# Patient Record
Sex: FEMALE | Race: WHITE | Employment: UNEMPLOYED | URBAN - METROPOLITAN AREA
[De-identification: names, ages, dates, MRNs, and addresses within clinical notes are randomized per-mention and may not be internally consistent; named-entity substitution may affect disease eponyms.]

---

## 2021-01-22 ENCOUNTER — HOSPITAL ENCOUNTER (EMERGENCY)
Age: 61
Discharge: HOME | End: 2021-01-23
Attending: EMERGENCY MEDICINE
Payer: COMMERCIAL

## 2021-01-22 ENCOUNTER — APPOINTMENT (OUTPATIENT)
Dept: CT IMAGING | Age: 61
End: 2021-01-22
Attending: EMERGENCY MEDICINE
Payer: COMMERCIAL

## 2021-01-22 ENCOUNTER — APPOINTMENT (OUTPATIENT)
Dept: ULTRASOUND IMAGING | Age: 61
End: 2021-01-22
Attending: EMERGENCY MEDICINE
Payer: COMMERCIAL

## 2021-01-22 DIAGNOSIS — R10.84 ABDOMINAL PAIN, GENERALIZED: Primary | ICD-10-CM

## 2021-01-22 DIAGNOSIS — D17.71 ANGIOMYOLIPOMA OF KIDNEY: ICD-10-CM

## 2021-01-22 DIAGNOSIS — K76.89 HEPATIC CYST: ICD-10-CM

## 2021-01-22 LAB
ALBUMIN SERPL-MCNC: 4 G/DL (ref 3.5–5)
ALBUMIN/GLOB SERPL: 1.1 {RATIO} (ref 1.1–2.2)
ALP SERPL-CCNC: 63 U/L (ref 45–117)
ALT SERPL-CCNC: 21 U/L (ref 12–78)
ANION GAP SERPL CALC-SCNC: 11 MMOL/L (ref 5–15)
APPEARANCE UR: CLEAR
AST SERPL-CCNC: 19 U/L (ref 15–37)
BACTERIA URNS QL MICRO: NEGATIVE /HPF
BASOPHILS # BLD: 0 K/UL (ref 0–0.1)
BASOPHILS NFR BLD: 0 % (ref 0–1)
BILIRUB SERPL-MCNC: 0.5 MG/DL (ref 0.2–1)
BILIRUB UR QL: NEGATIVE
BUN SERPL-MCNC: 18 MG/DL (ref 6–20)
BUN/CREAT SERPL: 21 (ref 12–20)
CALCIUM SERPL-MCNC: 9.2 MG/DL (ref 8.5–10.1)
CHLORIDE SERPL-SCNC: 100 MMOL/L (ref 97–108)
CO2 SERPL-SCNC: 29 MMOL/L (ref 21–32)
COLOR UR: ABNORMAL
COMMENT, HOLDF: NORMAL
CREAT SERPL-MCNC: 0.87 MG/DL (ref 0.55–1.02)
DIFFERENTIAL METHOD BLD: NORMAL
EOSINOPHIL # BLD: 0.2 K/UL (ref 0–0.4)
EOSINOPHIL NFR BLD: 3 % (ref 0–7)
EPITH CASTS URNS QL MICRO: ABNORMAL /LPF
ERYTHROCYTE [DISTWIDTH] IN BLOOD BY AUTOMATED COUNT: 12.6 % (ref 11.5–14.5)
GLOBULIN SER CALC-MCNC: 3.8 G/DL (ref 2–4)
GLUCOSE SERPL-MCNC: 98 MG/DL (ref 65–100)
GLUCOSE UR STRIP.AUTO-MCNC: NEGATIVE MG/DL
HCT VFR BLD AUTO: 41.2 % (ref 35–47)
HGB BLD-MCNC: 13.7 G/DL (ref 11.5–16)
HGB UR QL STRIP: ABNORMAL
IMM GRANULOCYTES # BLD AUTO: 0 K/UL (ref 0–0.04)
IMM GRANULOCYTES NFR BLD AUTO: 0 % (ref 0–0.5)
KETONES UR QL STRIP.AUTO: NEGATIVE MG/DL
LEUKOCYTE ESTERASE UR QL STRIP.AUTO: ABNORMAL
LIPASE SERPL-CCNC: 277 U/L (ref 73–393)
LYMPHOCYTES # BLD: 1.6 K/UL (ref 0.8–3.5)
LYMPHOCYTES NFR BLD: 24 % (ref 12–49)
MCH RBC QN AUTO: 28.2 PG (ref 26–34)
MCHC RBC AUTO-ENTMCNC: 33.3 G/DL (ref 30–36.5)
MCV RBC AUTO: 84.8 FL (ref 80–99)
MONOCYTES # BLD: 0.6 K/UL (ref 0–1)
MONOCYTES NFR BLD: 9 % (ref 5–13)
NEUTS SEG # BLD: 4.4 K/UL (ref 1.8–8)
NEUTS SEG NFR BLD: 64 % (ref 32–75)
NITRITE UR QL STRIP.AUTO: NEGATIVE
NRBC # BLD: 0 K/UL (ref 0–0.01)
NRBC BLD-RTO: 0 PER 100 WBC
PH UR STRIP: 6 [PH] (ref 5–8)
PLATELET # BLD AUTO: 283 K/UL (ref 150–400)
PMV BLD AUTO: 10.5 FL (ref 8.9–12.9)
POTASSIUM SERPL-SCNC: 3.3 MMOL/L (ref 3.5–5.1)
PROT SERPL-MCNC: 7.8 G/DL (ref 6.4–8.2)
PROT UR STRIP-MCNC: NEGATIVE MG/DL
RBC # BLD AUTO: 4.86 M/UL (ref 3.8–5.2)
RBC #/AREA URNS HPF: ABNORMAL /HPF (ref 0–5)
SAMPLES BEING HELD,HOLD: NORMAL
SODIUM SERPL-SCNC: 140 MMOL/L (ref 136–145)
SP GR UR REFRACTOMETRY: 1.01 (ref 1–1.03)
UR CULT HOLD, URHOLD: NORMAL
UROBILINOGEN UR QL STRIP.AUTO: 0.2 EU/DL (ref 0.2–1)
WBC # BLD AUTO: 6.9 K/UL (ref 3.6–11)
WBC URNS QL MICRO: ABNORMAL /HPF (ref 0–4)

## 2021-01-22 PROCEDURE — 36415 COLL VENOUS BLD VENIPUNCTURE: CPT

## 2021-01-22 PROCEDURE — 80053 COMPREHEN METABOLIC PANEL: CPT

## 2021-01-22 PROCEDURE — 99284 EMERGENCY DEPT VISIT MOD MDM: CPT

## 2021-01-22 PROCEDURE — 74011000636 HC RX REV CODE- 636

## 2021-01-22 PROCEDURE — 81001 URINALYSIS AUTO W/SCOPE: CPT

## 2021-01-22 PROCEDURE — 74011250636 HC RX REV CODE- 250/636: Performed by: EMERGENCY MEDICINE

## 2021-01-22 PROCEDURE — 76705 ECHO EXAM OF ABDOMEN: CPT

## 2021-01-22 PROCEDURE — 85025 COMPLETE CBC W/AUTO DIFF WBC: CPT

## 2021-01-22 PROCEDURE — 74177 CT ABD & PELVIS W/CONTRAST: CPT

## 2021-01-22 PROCEDURE — 83690 ASSAY OF LIPASE: CPT

## 2021-01-22 RX ORDER — VALSARTAN 80 MG/1
40 TABLET ORAL DAILY
COMMUNITY
End: 2022-07-08

## 2021-01-22 RX ORDER — HYDROCHLOROTHIAZIDE 12.5 MG/1
12.5 TABLET ORAL DAILY
COMMUNITY

## 2021-01-22 RX ORDER — SODIUM CHLORIDE 9 MG/ML
50 INJECTION, SOLUTION INTRAVENOUS
Status: COMPLETED | OUTPATIENT
Start: 2021-01-23 | End: 2021-01-23

## 2021-01-22 RX ORDER — SODIUM CHLORIDE 0.9 % (FLUSH) 0.9 %
10 SYRINGE (ML) INJECTION
Status: COMPLETED | OUTPATIENT
Start: 2021-01-23 | End: 2021-01-22

## 2021-01-22 RX ORDER — ATENOLOL 100 MG/1
100 TABLET ORAL DAILY
COMMUNITY
End: 2022-07-08

## 2021-01-22 RX ADMIN — Medication 10 ML: at 23:49

## 2021-01-22 RX ADMIN — SODIUM CHLORIDE 50 ML/HR: 900 INJECTION, SOLUTION INTRAVENOUS at 23:49

## 2021-01-22 RX ADMIN — IOPAMIDOL 100 ML: 755 INJECTION, SOLUTION INTRAVENOUS at 23:49

## 2021-01-23 VITALS
WEIGHT: 157.85 LBS | OXYGEN SATURATION: 98 % | RESPIRATION RATE: 15 BRPM | TEMPERATURE: 98.5 F | DIASTOLIC BLOOD PRESSURE: 92 MMHG | SYSTOLIC BLOOD PRESSURE: 128 MMHG | HEART RATE: 81 BPM

## 2021-01-23 RX ORDER — DICYCLOMINE HYDROCHLORIDE 10 MG/1
10 CAPSULE ORAL 4 TIMES DAILY
Qty: 28 CAP | Refills: 0 | Status: SHIPPED | OUTPATIENT
Start: 2021-01-23 | End: 2021-01-30

## 2021-01-23 RX ORDER — FAMOTIDINE 20 MG/1
20 TABLET, FILM COATED ORAL 2 TIMES DAILY
Qty: 20 TAB | Refills: 0 | Status: SHIPPED | OUTPATIENT
Start: 2021-01-23 | End: 2021-02-02

## 2021-01-23 NOTE — ED PROVIDER NOTES
69-year-old female with history of atrial fibrillation, hypertension presents with abdominal pain she describes as diffuse. She denies any nausea or vomiting. She denies any fevers or chills. She is moving her bowels normally. She denies any changes in her urination. She states that sometimes the pain gets worse with a deep breath. She rates her pain currently 3 out of 10. It is located in her abdomen and is generalized. She took some Pepcid with only minimal relief. It is worse with eating. Abdominal Pain          Past Medical History:   Diagnosis Date    A-fib ([address]     Vertigo        Past Surgical History:   Procedure Laterality Date    HX OTHER SURGICAL      Fibroid embolism approx 2015         History reviewed. No pertinent family history. Social History     Socioeconomic History    Marital status: Not on file     Spouse name: Not on file    Number of children: Not on file    Years of education: Not on file    Highest education level: Not on file   Occupational History    Not on file   Social Needs    Financial resource strain: Not on file    Food insecurity     Worry: Not on file     Inability: Not on file    Transportation needs     Medical: Not on file     Non-medical: Not on file   Tobacco Use    Smoking status: Never Smoker    Smokeless tobacco: Never Used   Substance and Sexual Activity    Alcohol use:  Yes    Drug use: Never    Sexual activity: Not on file   Lifestyle    Physical activity     Days per week: Not on file     Minutes per session: Not on file    Stress: Not on file   Relationships    Social connections     Talks on phone: Not on file     Gets together: Not on file     Attends Caodaism service: Not on file     Active member of club or organization: Not on file     Attends meetings of clubs or organizations: Not on file     Relationship status: Not on file    Intimate partner violence     Fear of current or ex partner: Not on file     Emotionally abused: Not on file     Physically abused: Not on file     Forced sexual activity: Not on file   Other Topics Concern    Not on file   Social History Narrative    Not on file         ALLERGIES: Patient has no known allergies. Review of Systems   Gastrointestinal: Positive for abdominal pain. All other systems reviewed and are negative. Vitals:    01/22/21 2136   BP: (!) 142/103   Pulse: 84   Resp: 16   Temp: 98.5 °F (36.9 °C)   SpO2: 96%   Weight: 71.6 kg (157 lb 13.6 oz)            Physical Exam  Vitals signs and nursing note reviewed. Constitutional:       General: She is not in acute distress. HENT:      Head: Normocephalic and atraumatic. Mouth/Throat:      Mouth: Mucous membranes are moist.   Eyes:      General: No scleral icterus. Conjunctiva/sclera: Conjunctivae normal.      Pupils: Pupils are equal, round, and reactive to light. Neck:      Musculoskeletal: Neck supple. Trachea: No tracheal deviation. Cardiovascular:      Rate and Rhythm: Normal rate and regular rhythm. Pulmonary:      Effort: Pulmonary effort is normal. No respiratory distress. Breath sounds: No wheezing or rales. Abdominal:      General: There is no distension. Palpations: Abdomen is soft. Tenderness: There is abdominal tenderness in the right upper quadrant and epigastric area. Genitourinary:     Comments: deferred  Musculoskeletal:         General: No deformity. Skin:     General: Skin is warm and dry. Neurological:      General: No focal deficit present. Mental Status: She is alert. Psychiatric:         Mood and Affect: Mood normal.          MDM       Procedures    Abbie Fulton was evaluated in the Emergency Department on 1/22/2021 for the symptoms described in the history of present illness.  He/she was evaluated in the context of the global COVID-19 pandemic, which necessitated consideration that the patient might be at risk for infection with the SARS-CoV-2 virus that causes COVID-19. Institutional protocols and algorithms that pertain to the evaluation of patients at risk for COVID-19 are in a state of rapid change based on information released by regulatory bodies including the CDC and federal and state organizations. These policies and algorithms were followed during the patient's care in the ED. Surrogate Decision Maker: No emergency contact information on file. MEDICAL DECISION MAKIN y.o. female with past medical history significant for afib, htn presents with abdominal pain  Differential diagnosis includes but not limited to:  Cholecystitis, pancreatitis, PUD, bilary colic      LABORATORY TESTS:  Labs Reviewed   URINE CULTURE HOLD SAMPLE   SAMPLES BEING HELD   CBC WITH AUTOMATED DIFF   LIPASE   METABOLIC PANEL, COMPREHENSIVE   URINALYSIS W/MICROSCOPIC       IMAGING RESULTS:  CT ABD PELV W CONT   Final Result   1. No acute abnormality in the abdomen or pelvis. 2. Liver cysts. Tiny right kidney lesions that appear to be fat density in   keeping with angiomyolipomas. Uterine fibroid. US ABD LTD   Final Result   1. No evidence of gallstones. 2. There are multiple cysts throughout the liver with the largest in the right   lobe measuring 7.1 x 6.9 cm.   3. There is a 1 cm echogenic lesion in the right kidney. This could represent an   angiomyolipoma. Comparison with prior studies would be helpful. If none are   available, CT of the kidneys is recommended. EKG:  none completed    MEDICATIONS GIVEN:  Medications - No data to display    CONSULTS:  None    PROGRESS NOTE:   12:16 AM Patient has remained stable and is ready for discharge    IMPRESSION:  1. Abdominal pain, generalized    2. Angiomyolipoma of kidney    3. Hepatic cyst        PLAN:  -   Current Discharge Medication List      START taking these medications    Details   dicyclomine (BENTYL) 10 mg capsule Take 1 Cap by mouth four (4) times daily for 7 days.   Qty: 28 Cap, Refills: 0 famotidine (Pepcid) 20 mg tablet Take 1 Tab by mouth two (2) times a day for 10 days. Qty: 20 Tab, Refills: 0           Follow-up Information     Follow up With Specialties Details Why Contact Info    Bhavana Burrell, DO Family Medicine Schedule an appointment as soon as possible for a visit   [address] [facility]  [address] [place] [address]      Alejandro Perez MD Gastroenterology Schedule an appointment as soon as possible for a visit   [address] [phone]  [phone]      [facility] [address] [facility] Emergency Medicine  If symptoms worsen or new concerns Helena Deleon [phone]        Return precautions given    CONDITION:  good    Total critical care time spent exclusive of procedures:  0 minutes    Bill Goode MD

## 2021-01-23 NOTE — ED NOTES
Dr. Alexandra Schulz reviewed discharge instructions with the patient. The patient verbalized understanding. Patient ambulated out of the emergency department without assistance. Patient remains in pain, but has relief of most intense pain. Patient is in no apparent distress.

## 2021-01-23 NOTE — ED TRIAGE NOTES
Patient arrives with c/o abdominal pain onset Wednesday, worse after eating. Took Pepcid with a little relief. Denies nausea, vomiting, diarrhea, constipation.

## 2021-01-25 ENCOUNTER — TRANSCRIBE ORDER (OUTPATIENT)
Dept: SCHEDULING | Age: 61
End: 2021-01-25

## 2021-01-25 DIAGNOSIS — R10.11 RUQ PAIN: ICD-10-CM

## 2021-01-25 DIAGNOSIS — R10.13 ABDOMINAL PAIN, EPIGASTRIC: Primary | ICD-10-CM

## 2021-09-18 LAB — SARS-COV-2, NAA: NEGATIVE

## 2022-07-08 ENCOUNTER — OFFICE VISIT (OUTPATIENT)
Dept: CARDIOLOGY CLINIC | Age: 62
End: 2022-07-08
Payer: COMMERCIAL

## 2022-07-08 VITALS
OXYGEN SATURATION: 98 % | DIASTOLIC BLOOD PRESSURE: 80 MMHG | SYSTOLIC BLOOD PRESSURE: 120 MMHG | HEART RATE: 64 BPM | HEIGHT: 64 IN | WEIGHT: 160 LBS | BODY MASS INDEX: 27.31 KG/M2 | RESPIRATION RATE: 14 BRPM

## 2022-07-08 DIAGNOSIS — I48.0 PAROXYSMAL A-FIB (HCC): Primary | ICD-10-CM

## 2022-07-08 DIAGNOSIS — I10 BENIGN ESSENTIAL HTN: ICD-10-CM

## 2022-07-08 DIAGNOSIS — I20.0 UNSTABLE ANGINA (HCC): ICD-10-CM

## 2022-07-08 DIAGNOSIS — Z87.898 HISTORY OF VERTIGO: ICD-10-CM

## 2022-07-08 PROCEDURE — 93000 ELECTROCARDIOGRAM COMPLETE: CPT | Performed by: INTERNAL MEDICINE

## 2022-07-08 PROCEDURE — 99204 OFFICE O/P NEW MOD 45 MIN: CPT | Performed by: INTERNAL MEDICINE

## 2022-07-08 RX ORDER — ATENOLOL 50 MG/1
50 TABLET ORAL DAILY
COMMUNITY
End: 2022-07-08

## 2022-07-08 RX ORDER — CHOLECALCIFEROL TAB 125 MCG (5000 UNIT) 125 MCG
5000 TAB ORAL DAILY
COMMUNITY

## 2022-07-08 RX ORDER — VITAMIN E 1000 UNIT
1000 CAPSULE ORAL
COMMUNITY

## 2022-07-08 RX ORDER — VALSARTAN 40 MG/1
40 TABLET ORAL DAILY
COMMUNITY

## 2022-07-08 NOTE — PROGRESS NOTES
[facility] [place]: Anoop Bennett  [phone]    History of Present Illness:   Ms. Edgar Werner is a 66 yo F with history of paroxysmal atrial fibrillation, seen in the past by Dr. Karie Duncan, as well as cardiology in [place], essential hypertension, history of vertigo, family history of early coronary artery disease, coronary calcium score in 02/2015 was zero, referred by Dr. Lory Askew for cardiac evaluation. She is here due to when she was in [place] two weeks ago she was having a lot of problems with fatigue and easily short of breath with activity. She does think some of this may have been due to altitude. She denies any chest pain. She says that since being back that seems to have subsided and been better. She has been having some issues with vertigo. She has had some blurred vision that has been persistent and she is going to see her ophthalmologist.  She says she was diagnosed with atrial fibrillation over 20 years ago. She had woken up after having three glasses of wine and her heart was \"going crazy\". Over the years, she has had occasions of palpitations, though she thinks the last time she had significant palpitations was three or four years ago. There are some occasions where she just feels some irregularity of heartbeats. She is compensated on exam with clear lungs and no lower extremity edema. Her EKG here is normal sinus rhythm. I did review her records from Dr. Karie Duncan, from her cardiologist in [place] as well. She has had several Holters, most recently in 2020 that was normal without atrial fibrillation. She did have a stress test in 2017 that was normal and her coronary calcium score was zero in 2015. Stress test 2017 normal, 1/2020 holter ok. Coronary calcium score 2/2015 zero  Soc hx. No tobacco. Dr. Sallie Gallardo is her brother  Fam hx. Brother with 4 stents, father with CAD  Assessment and Plan:    1. Unstable angina.   Exertional shortness of breath, possible anginal equivalent, multiple risk factors; will proceed with a treadmill stress echocardiogram.  If this is unrevealing, will have her follow back in one year. 2. Paroxysmal atrial fibrillation. Her CHADS VASC score is borderline. Also her atrial fibrillation has been very infrequent. However, this can happen without symptoms. She is going to check on her blurred vision. If there is a concern for a stroke or mini-stroke with this, I would recommend anticoagulation now. If not, I think when she does get to age 73, anticoagulation would be indicated and we talked about possibly Eliquis at that time. 3. Family history of early coronary artery disease. 4. Essential hypertension. Blood pressure is controlled. She  has a past medical history of A-fib ([address] and Vertigo. All other systems negative except as above. PE  Vitals:    07/08/22 1003   BP: 120/80   Pulse: 64   Resp: 14   SpO2: 98%   Weight: 160 lb (72.6 kg)   Height: 5' 4\" (1.626 m)    Body mass index is 27.46 kg/m².    General appearance - alert, well appearing, and in no distress  Mental status - affect appropriate to mood  Eyes - sclera anicteric, moist mucous membranes  Neck - supple, no JVD  Chest - clear to auscultation, no wheezes, rales or rhonchi  Heart - normal rate, regular rhythm, normal S1, S2, no murmurs, rubs, clicks or gallops  Abdomen - soft, nontender, nondistended, no masses or organomegaly  Neurological -no focal deficit  Extremities - peripheral pulses normal, no pedal edema      Recent Labs:  No results found for: CHOL, CHOLX, CHLST, CHOLV, 408305, HDL, HDLP, LDL, LDLC, DLDLP, TGLX, TRIGL, TRIGP, CHHD, [facility]  Lab Results   Component Value Date/Time    Creatinine 0.87 01/22/2021 09:28 PM     Lab Results   Component Value Date/Time    BUN 18 01/22/2021 09:28 PM     Lab Results   Component Value Date/Time    Potassium 3.3 (L) 01/22/2021 09:28 PM     No results found for: HBA1C, PMN9VOVP  Lab Results   Component Value Date/Time    HGB 13.7 01/22/2021 09:28 PM     Lab Results   Component Value Date/Time    PLATELET 332 94/43/4134 09:28 PM       Reviewed:  Past Medical History:   Diagnosis Date    A-fib ([address]     Vertigo      Social History     Tobacco Use   Smoking Status Former Smoker   Smokeless Tobacco Never Used     Social History     Substance and Sexual Activity   Alcohol Use Yes    Comment: occasional     No Known Allergies    Current Outpatient Medications   Medication Sig    atenoloL (TENORMIN) 50 mg tablet Take 50 mg by mouth daily.  valsartan (DIOVAN) 40 mg tablet Take 40 mg by mouth daily.  cholecalciferol (Vitamin D3) (5000 Units/125 mcg) tab tablet Take 5,000 Units by mouth daily.  ascorbic acid, vitamin C, (Vitamin C) 1,000 mg tablet Take 1,000 mg by mouth. occasional    hydroCHLOROthiazide (HYDRODIURIL) 12.5 mg tablet Take 12.5 mg by mouth daily.  valsartan (DIOVAN) 80 mg tablet Take 40 mg by mouth daily. (Patient not taking: Reported on 7/8/2022)    atenoloL (TENORMIN) 100 mg tablet Take 100 mg by mouth daily. (Patient not taking: Reported on 7/8/2022)     No current facility-administered medications for this visit.        Susana Domingo MD  [facility] [place]  [facility] [address] [facility] [address]  [address]

## 2022-07-08 NOTE — LETTER
Patient:  Osiel Lilly   YOB: 1960  Date of Visit: 7/8/2022    Dear Kennyjazmyne Castañeda, [address] [facility]  [address] [facility] [address]  Via Fax: [phone]: Thank you for referring Ms. Osiel Lilly to me for evaluation/treatment. Below are the relevant portions of my assessment and plan of care. Ms. Tomas Da Silva is a 64 yo F with history of paroxysmal atrial fibrillation, seen in the past by Dr. Jay Cruz, as well as cardiology in [place], essential hypertension, history of vertigo, family history of early coronary artery disease, coronary calcium score in 02/2015 was zero, referred by Dr. Duffy 0557 for cardiac evaluation. She is here due to when she was in [place] two weeks ago she was having a lot of problems with fatigue and easily short of breath with activity. She does think some of this may have been due to altitude. She denies any chest pain. She says that since being back that seems to have subsided and been better. She has been having some issues with vertigo. She has had some blurred vision that has been persistent and she is going to see her ophthalmologist.  She says she was diagnosed with atrial fibrillation over 20 years ago. She had woken up after having three glasses of wine and her heart was \"going crazy\". Over the years, she has had occasions of palpitations, though she thinks the last time she had significant palpitations was three or four years ago. There are some occasions where she just feels some irregularity of heartbeats. She is compensated on exam with clear lungs and no lower extremity edema. Her EKG here is normal sinus rhythm. I did review her records from Dr. Jay Cruz, from her cardiologist in [place] as well. She has had several Holters, most recently in 2020 that was normal without atrial fibrillation. She did have a stress test in 2017 that was normal and her coronary calcium score was zero in 2015. Stress test 2017 normal, 1/2020 holter ok.  Coronary calcium score 2/2015 zero  Soc hx. No tobacco. Dr. Andrea Hurtado is her brother  Fam hx. Brother with 4 stents, father with CAD  Assessment and Plan:    1. Unstable angina. Exertional shortness of breath, possible anginal equivalent, multiple risk factors; will proceed with a treadmill stress echocardiogram.  If this is unrevealing, will have her follow back in one year. 2. Paroxysmal atrial fibrillation. Her CHADS VASC score is borderline. Also her atrial fibrillation has been very infrequent. However, this can happen without symptoms. She is going to check on her blurred vision. If there is a concern for a stroke or mini-stroke with this, I would recommend anticoagulation now. If not, I think when she does get to age 73, anticoagulation would be indicated and we talked about possibly Eliquis at that time. 3. Family history of early coronary artery disease. 4. Essential hypertension. Blood pressure is controlled. If you have questions, please do not hesitate to call me.       Sincerely,      Susana Domingo MD

## 2022-08-17 ENCOUNTER — TELEPHONE (OUTPATIENT)
Dept: CARDIOLOGY CLINIC | Age: 62
End: 2022-08-17

## 2022-08-17 DIAGNOSIS — I20.0 UNSTABLE ANGINA (HCC): Primary | ICD-10-CM

## 2022-08-17 NOTE — TELEPHONE ENCOUNTER
Good Morning,    The Stress Echo that Dr. Hutchison Corporal ordered was DENIED by Jay Alcantara can be reached at [phone] with a case number of [id number] if a Peer to Peer need to be done    Please call the patient to CANCEL this appt    [facility]

## 2022-08-22 ENCOUNTER — TELEPHONE (OUTPATIENT)
Dept: CARDIOLOGY CLINIC | Age: 62
End: 2022-08-22

## 2022-08-22 NOTE — TELEPHONE ENCOUNTER
Kip Mosqueda MD  You Yesterday (1:00 AM)     SW  Would switch this to a routine treadmill and separate echo (for unstable angina).  thx      Orders placed

## 2022-08-22 NOTE — TELEPHONE ENCOUNTER
Future Appointments   Date Time Provider Taty Lenz   8/29/2022  9:00 AM SERGEY CAMPBELL BS AMB   9/26/2022  3:45 PM SERGEY COLEY AMB   7/11/2023 10:40 AM MD ULISSES Alejandre BS AMB

## 2022-08-29 ENCOUNTER — ANCILLARY PROCEDURE (OUTPATIENT)
Dept: CARDIOLOGY CLINIC | Age: 62
End: 2022-08-29
Payer: COMMERCIAL

## 2022-08-29 VITALS
SYSTOLIC BLOOD PRESSURE: 122 MMHG | DIASTOLIC BLOOD PRESSURE: 80 MMHG | BODY MASS INDEX: 27.31 KG/M2 | WEIGHT: 160 LBS | HEIGHT: 64 IN

## 2022-08-29 DIAGNOSIS — I20.0 UNSTABLE ANGINA (HCC): ICD-10-CM

## 2022-08-29 LAB
STRESS ANGINA INDEX: 0
STRESS BASELINE DIAS BP: 80 MMHG
STRESS BASELINE HR: 67 BPM
STRESS BASELINE ST DEPRESSION: 0 MM
STRESS BASELINE SYS BP: 122 MMHG
STRESS ESTIMATED WORKLOAD: 11.9 METS
STRESS EXERCISE DUR MIN: 9 MIN
STRESS EXERCISE DUR SEC: 34 SEC
STRESS O2 SAT PEAK: 100 %
STRESS O2 SAT REST: 100 %
STRESS PEAK DIAS BP: 90 MMHG
STRESS PEAK SYS BP: 150 MMHG
STRESS PERCENT HR ACHIEVED: 84 %
STRESS POST PEAK HR: 134 BPM
STRESS RATE PRESSURE PRODUCT: NORMAL BPM*MMHG
STRESS SR DUKE TREADMILL SCORE: 10
STRESS ST DEPRESSION: 0 MM
STRESS TARGET HR: 159 BPM

## 2022-08-29 PROCEDURE — 93015 CV STRESS TEST SUPVJ I&R: CPT | Performed by: INTERNAL MEDICINE

## 2022-09-26 ENCOUNTER — ANCILLARY PROCEDURE (OUTPATIENT)
Dept: CARDIOLOGY CLINIC | Age: 62
End: 2022-09-26
Payer: COMMERCIAL

## 2022-09-26 ENCOUNTER — TELEPHONE (OUTPATIENT)
Dept: CARDIOLOGY CLINIC | Age: 62
End: 2022-09-26

## 2022-09-26 VITALS — HEIGHT: 64 IN | BODY MASS INDEX: 27.31 KG/M2 | WEIGHT: 160 LBS

## 2022-09-26 DIAGNOSIS — I20.0 UNSTABLE ANGINA (HCC): ICD-10-CM

## 2022-09-26 LAB
ECHO AO ASC DIAM: 3.8 CM
ECHO AO ASCENDING AORTA INDEX: 2.13 CM/M2
ECHO AO ROOT DIAM: 3.6 CM
ECHO AO ROOT INDEX: 2.02 CM/M2
ECHO AV AREA PEAK VELOCITY: 2.1 CM2
ECHO AV AREA VTI: 2.1 CM2
ECHO AV AREA/BSA PEAK VELOCITY: 1.2 CM2/M2
ECHO AV AREA/BSA VTI: 1.2 CM2/M2
ECHO AV MEAN GRADIENT: 3 MMHG
ECHO AV MEAN VELOCITY: 0.9 M/S
ECHO AV PEAK GRADIENT: 6 MMHG
ECHO AV PEAK VELOCITY: 1.2 M/S
ECHO AV VELOCITY RATIO: 0.67
ECHO AV VTI: 23 CM
ECHO LA DIAMETER INDEX: 1.69 CM/M2
ECHO LA DIAMETER: 3 CM
ECHO LA TO AORTIC ROOT RATIO: 0.83
ECHO LA VOL 2C: 69 ML (ref 22–52)
ECHO LA VOL 4C: 41 ML (ref 22–52)
ECHO LA VOL BP: 55 ML (ref 22–52)
ECHO LA VOL/BSA BIPLANE: 31 ML/M2 (ref 16–34)
ECHO LA VOLUME AREA LENGTH: 59 ML
ECHO LA VOLUME INDEX A2C: 39 ML/M2 (ref 16–34)
ECHO LA VOLUME INDEX A4C: 23 ML/M2 (ref 16–34)
ECHO LA VOLUME INDEX AREA LENGTH: 33 ML/M2 (ref 16–34)
ECHO LV E' LATERAL VELOCITY: 6 CM/S
ECHO LV E' SEPTAL VELOCITY: 4 CM/S
ECHO LV FRACTIONAL SHORTENING: 40 % (ref 28–44)
ECHO LV INTERNAL DIMENSION DIASTOLE INDEX: 2.25 CM/M2
ECHO LV INTERNAL DIMENSION DIASTOLIC: 4 CM (ref 3.9–5.3)
ECHO LV INTERNAL DIMENSION SYSTOLIC INDEX: 1.35 CM/M2
ECHO LV INTERNAL DIMENSION SYSTOLIC: 2.4 CM
ECHO LV IVSD: 1 CM (ref 0.6–0.9)
ECHO LV MASS 2D: 136.2 G (ref 67–162)
ECHO LV MASS INDEX 2D: 76.5 G/M2 (ref 43–95)
ECHO LV POSTERIOR WALL DIASTOLIC: 1.1 CM (ref 0.6–0.9)
ECHO LV RELATIVE WALL THICKNESS RATIO: 0.55
ECHO LVOT AREA: 3.1 CM2
ECHO LVOT AV VTI INDEX: 0.68
ECHO LVOT DIAM: 2 CM
ECHO LVOT MEAN GRADIENT: 2 MMHG
ECHO LVOT PEAK GRADIENT: 3 MMHG
ECHO LVOT PEAK VELOCITY: 0.8 M/S
ECHO LVOT STROKE VOLUME INDEX: 27.5 ML/M2
ECHO LVOT SV: 49 ML
ECHO LVOT VTI: 15.6 CM
ECHO MV A VELOCITY: 0.54 M/S
ECHO MV E DECELERATION TIME (DT): 256.9 MS
ECHO MV E VELOCITY: 0.46 M/S
ECHO MV E/A RATIO: 0.85
ECHO MV E/E' LATERAL: 7.67
ECHO MV E/E' RATIO (AVERAGED): 9.58
ECHO MV E/E' SEPTAL: 11.5
ECHO PV MAX VELOCITY: 0.7 M/S
ECHO PV PEAK GRADIENT: 2 MMHG
ECHO RA MINOR AXIS INDEX: 1.74 CM/M2
ECHO RA MINOR AXIS: 3.1 CM
ECHO RV INTERNAL DIMENSION: 3.4 CM
ECHO RV TAPSE: 2.3 CM (ref 1.7–?)
ECHO TV REGURGITANT MAX VELOCITY: 2.75 M/S
ECHO TV REGURGITANT PEAK GRADIENT: 30 MMHG

## 2022-09-26 PROCEDURE — 93306 TTE W/DOPPLER COMPLETE: CPT | Performed by: INTERNAL MEDICINE

## 2022-09-26 NOTE — TELEPHONE ENCOUNTER
Patient is scheduled for an echocardiogram today, she is requesting a return call for confirmation that she should go through with the test.          PHONE: [phone]

## 2022-09-27 NOTE — TELEPHONE ENCOUNTER
MD Christy Mckeon, RN  Please let pt know echo was overall normal. thx     Left message with above information and asked for call back if neded

## 2022-12-27 ENCOUNTER — TELEPHONE (OUTPATIENT)
Dept: CARDIOLOGY CLINIC | Age: 62
End: 2022-12-27

## 2022-12-27 NOTE — TELEPHONE ENCOUNTER
Patient is calling with concerns about the medication Hydrochlorothiazide 12.5mg. She states that she read an article that says medication can cause cancer and would like to know if we have any information on this . Please Advise.      [phone]

## 2023-01-04 NOTE — TELEPHONE ENCOUNTER
Karin Godinez MD  You 5 hours ago (9:41 AM)     SW  Please let pt know there was a study that suggested some increase in skin CA of the lip. But this was dose dependent and she is on the smallest dose. This BP med is considered first line therapy; meaning that side effect profile is low compared to other BP meds so I would recommend continuing this.  thx       Called pt and let her know the above information and she stated that she understood

## 2023-08-23 ENCOUNTER — OFFICE VISIT (OUTPATIENT)
Age: 63
End: 2023-08-23

## 2023-08-23 VITALS
HEART RATE: 65 BPM | OXYGEN SATURATION: 99 % | SYSTOLIC BLOOD PRESSURE: 110 MMHG | BODY MASS INDEX: 24.59 KG/M2 | WEIGHT: 144 LBS | DIASTOLIC BLOOD PRESSURE: 80 MMHG | RESPIRATION RATE: 16 BRPM | HEIGHT: 64 IN

## 2023-08-23 DIAGNOSIS — I10 BENIGN ESSENTIAL HTN: ICD-10-CM

## 2023-08-23 DIAGNOSIS — Z82.49 FAMILY HISTORY OF EARLY CAD: ICD-10-CM

## 2023-08-23 DIAGNOSIS — I48.0 PAROXYSMAL ATRIAL FIBRILLATION (HCC): Primary | ICD-10-CM

## 2023-08-23 RX ORDER — ATENOLOL 50 MG/1
50 TABLET ORAL DAILY
COMMUNITY
Start: 2023-05-25

## 2023-08-23 NOTE — PROGRESS NOTES
[facility] [place]: Jose De Jesus  [phone]    History of Present Illness:   Ms. Mari Tee is a 58 yo F with history of paroxysmal atrial fibrillation, seen in the past by Dr. Dragan Carbajal, as well as cardiology in [place], essential hypertension, history of vertigo, family history of early coronary artery disease, coronary calcium score in 02/2015 was zero. On her last visit due to exertional shortness of breath, possible anginal equivalent, proceeded with an echocardiogram and stress test.  Both of these were normal.  This past year, she has worked on trying to do things naturally. She has watched her dietary intake and has been active and she was able to come off Valsartan because her blood pressure was going low at times. For her arthritis and vertigo, she underwent acupuncture and red light therapy and this helped. She denies any palpitations. Her breathing has been stable. No exertional chest pain. She is compensated on exam with clear lungs and no lower extremity edema. Her EKG here is normal sinus rhythm. Soc hx. No tobacco. Dr. Joy Dale is her brother  Fam hx. Brother with 4 stents, father with CAD  Assessment and Plan:   1. Paroxysmal atrial fibrillation. Stable on beta blocker for rate control. She is in sinus rhythm without symptoms. We did have a discussion in the past that when she gets to age 73 though anticoagulation would be indicated and I still think that is the case. She asked about natural ways to alternatives, but there is not. Will have her follow back in one year. 2. Family history of early coronary artery disease. 3. Essential hypertension. Blood pressure is controlled. Stress test in 2017 that was normal and her coronary calcium score was zero in 2015. She  has a past medical history of A-fib ([address]) and Vertigo. All other systems negative except as above.      PE  Vitals:    08/23/23 1014   BP: 110/80   Pulse: 65   Resp: 16   SpO2: 99%   Weight: 144 lb (65.3 kg)

## 2023-08-25 ENCOUNTER — HOSPITAL ENCOUNTER (OUTPATIENT)
Facility: HOSPITAL | Age: 63
Discharge: HOME | End: 2023-08-25
Payer: COMMERCIAL

## 2023-08-25 DIAGNOSIS — K76.89 LIVER CYST: ICD-10-CM

## 2023-08-25 DIAGNOSIS — N28.1 KIDNEY CYSTS: ICD-10-CM

## 2023-08-25 PROCEDURE — 6360000004 HC RX CONTRAST MEDICATION: Performed by: GENERAL PRACTICE

## 2023-08-25 PROCEDURE — 74170 CT ABD WO CNTRST FLWD CNTRST: CPT

## 2023-08-25 RX ADMIN — IOPAMIDOL 100 ML: 755 INJECTION, SOLUTION INTRAVENOUS at 09:24

## 2025-03-22 ENCOUNTER — HOSPITAL ENCOUNTER (EMERGENCY)
Facility: HOSPITAL | Age: 65
Discharge: HOME | End: 2025-03-22
Attending: EMERGENCY MEDICINE

## 2025-03-22 VITALS
HEIGHT: 64 IN | TEMPERATURE: 98.2 F | SYSTOLIC BLOOD PRESSURE: 166 MMHG | RESPIRATION RATE: 16 BRPM | OXYGEN SATURATION: 98 % | WEIGHT: 150 LBS | DIASTOLIC BLOOD PRESSURE: 94 MMHG | HEART RATE: 93 BPM | BODY MASS INDEX: 25.61 KG/M2

## 2025-03-22 DIAGNOSIS — S81.811A LACERATION OF RIGHT LOWER EXTREMITY, INITIAL ENCOUNTER: Primary | ICD-10-CM

## 2025-03-22 PROCEDURE — 12005 RPR S/N/A/GEN/TRK12.6-20.0CM: CPT

## 2025-03-22 PROCEDURE — 99283 EMERGENCY DEPT VISIT LOW MDM: CPT

## 2025-03-22 PROCEDURE — 6360000002 HC RX W HCPCS: Performed by: EMERGENCY MEDICINE

## 2025-03-22 RX ORDER — LIDOCAINE HYDROCHLORIDE 10 MG/ML
20 INJECTION, SOLUTION EPIDURAL; INFILTRATION; INTRACAUDAL; PERINEURAL ONCE
Status: COMPLETED | OUTPATIENT
Start: 2025-03-22 | End: 2025-03-22

## 2025-03-22 RX ORDER — CEPHALEXIN 500 MG/1
500 CAPSULE ORAL 3 TIMES DAILY
Qty: 30 CAPSULE | Refills: 0 | Status: SHIPPED | OUTPATIENT
Start: 2025-03-22 | End: 2025-04-01

## 2025-03-22 RX ADMIN — LIDOCAINE HYDROCHLORIDE 20 ML: 10 INJECTION, SOLUTION EPIDURAL; INFILTRATION; INTRACAUDAL; PERINEURAL at 19:39

## 2025-03-22 ASSESSMENT — LIFESTYLE VARIABLES
HOW MANY STANDARD DRINKS CONTAINING ALCOHOL DO YOU HAVE ON A TYPICAL DAY: 1 OR 2
HOW OFTEN DO YOU HAVE A DRINK CONTAINING ALCOHOL: MONTHLY OR LESS

## 2025-03-22 ASSESSMENT — PAIN - FUNCTIONAL ASSESSMENT
PAIN_FUNCTIONAL_ASSESSMENT: NONE - DENIES PAIN
PAIN_FUNCTIONAL_ASSESSMENT: NONE - DENIES PAIN
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
